# Patient Record
Sex: MALE | Race: BLACK OR AFRICAN AMERICAN | HISPANIC OR LATINO | URBAN - METROPOLITAN AREA
[De-identification: names, ages, dates, MRNs, and addresses within clinical notes are randomized per-mention and may not be internally consistent; named-entity substitution may affect disease eponyms.]

---

## 2022-06-08 ENCOUNTER — APPOINTMENT (EMERGENCY)
Dept: RADIOLOGY | Facility: HOSPITAL | Age: 37
End: 2022-06-08
Payer: COMMERCIAL

## 2022-06-08 ENCOUNTER — HOSPITAL ENCOUNTER (EMERGENCY)
Facility: HOSPITAL | Age: 37
Discharge: HOME | End: 2022-06-08
Attending: EMERGENCY MEDICINE | Admitting: EMERGENCY MEDICINE
Payer: COMMERCIAL

## 2022-06-08 VITALS
TEMPERATURE: 98 F | SYSTOLIC BLOOD PRESSURE: 132 MMHG | OXYGEN SATURATION: 94 % | BODY MASS INDEX: 32.47 KG/M2 | WEIGHT: 245 LBS | HEART RATE: 57 BPM | RESPIRATION RATE: 18 BRPM | DIASTOLIC BLOOD PRESSURE: 72 MMHG | HEIGHT: 73 IN

## 2022-06-08 DIAGNOSIS — R42 DIZZINESS: Primary | ICD-10-CM

## 2022-06-08 DIAGNOSIS — R07.89 ATYPICAL CHEST PAIN: ICD-10-CM

## 2022-06-08 LAB
2HR DELTA HS TROPONIN: 0 NG/L
ALBUMIN SERPL BCP-MCNC: 4.4 G/DL (ref 3.5–5)
ALP SERPL-CCNC: 39 U/L (ref 34–104)
ALT SERPL W P-5'-P-CCNC: 21 U/L (ref 7–52)
ANION GAP SERPL CALCULATED.3IONS-SCNC: 9 MMOL/L (ref 4–13)
AST SERPL W P-5'-P-CCNC: 12 U/L (ref 13–39)
ATRIAL RATE: 62 BPM
BASOPHILS # BLD AUTO: 0.05 THOUSANDS/ΜL (ref 0–0.1)
BASOPHILS NFR BLD AUTO: 1 % (ref 0–1)
BILIRUB SERPL-MCNC: 0.38 MG/DL (ref 0.2–1)
BUN SERPL-MCNC: 16 MG/DL (ref 5–25)
CALCIUM SERPL-MCNC: 9.8 MG/DL (ref 8.4–10.2)
CARDIAC TROPONIN I PNL SERPL HS: 3 NG/L
CARDIAC TROPONIN I PNL SERPL HS: 3 NG/L
CHLORIDE SERPL-SCNC: 103 MMOL/L (ref 96–108)
CO2 SERPL-SCNC: 25 MMOL/L (ref 21–32)
CREAT SERPL-MCNC: 1.05 MG/DL (ref 0.6–1.3)
D DIMER PPP FEU-MCNC: 0.37 UG/ML FEU
EOSINOPHIL # BLD AUTO: 0.1 THOUSAND/ΜL (ref 0–0.61)
EOSINOPHIL NFR BLD AUTO: 1 % (ref 0–6)
ERYTHROCYTE [DISTWIDTH] IN BLOOD BY AUTOMATED COUNT: 13.3 % (ref 11.6–15.1)
GFR SERPL CREATININE-BSD FRML MDRD: 88 ML/MIN/1.73SQ M
GLUCOSE SERPL-MCNC: 127 MG/DL (ref 65–140)
HCT VFR BLD AUTO: 44.3 % (ref 36.5–49.3)
HGB BLD-MCNC: 14.4 G/DL (ref 12–17)
IMM GRANULOCYTES # BLD AUTO: 0.02 THOUSAND/UL (ref 0–0.2)
IMM GRANULOCYTES NFR BLD AUTO: 0 % (ref 0–2)
LYMPHOCYTES # BLD AUTO: 1.91 THOUSANDS/ΜL (ref 0.6–4.47)
LYMPHOCYTES NFR BLD AUTO: 24 % (ref 14–44)
MCH RBC QN AUTO: 27.3 PG (ref 26.8–34.3)
MCHC RBC AUTO-ENTMCNC: 32.5 G/DL (ref 31.4–37.4)
MCV RBC AUTO: 84 FL (ref 82–98)
MONOCYTES # BLD AUTO: 0.47 THOUSAND/ΜL (ref 0.17–1.22)
MONOCYTES NFR BLD AUTO: 6 % (ref 4–12)
NEUTROPHILS # BLD AUTO: 5.3 THOUSANDS/ΜL (ref 1.85–7.62)
NEUTS SEG NFR BLD AUTO: 68 % (ref 43–75)
NRBC BLD AUTO-RTO: 0 /100 WBCS
P AXIS: 74 DEGREES
PLATELET # BLD AUTO: 304 THOUSANDS/UL (ref 149–390)
PMV BLD AUTO: 9.6 FL (ref 8.9–12.7)
POTASSIUM SERPL-SCNC: 3.8 MMOL/L (ref 3.5–5.3)
PR INTERVAL: 132 MS
PROT SERPL-MCNC: 7.6 G/DL (ref 6.4–8.4)
QRS AXIS: 64 DEGREES
QRSD INTERVAL: 90 MS
QT INTERVAL: 398 MS
QTC INTERVAL: 403 MS
RBC # BLD AUTO: 5.27 MILLION/UL (ref 3.88–5.62)
SODIUM SERPL-SCNC: 137 MMOL/L (ref 135–147)
T WAVE AXIS: 65 DEGREES
VENTRICULAR RATE: 62 BPM
WBC # BLD AUTO: 7.85 THOUSAND/UL (ref 4.31–10.16)

## 2022-06-08 PROCEDURE — 99285 EMERGENCY DEPT VISIT HI MDM: CPT | Performed by: EMERGENCY MEDICINE

## 2022-06-08 PROCEDURE — 99285 EMERGENCY DEPT VISIT HI MDM: CPT

## 2022-06-08 PROCEDURE — 93010 ELECTROCARDIOGRAM REPORT: CPT | Performed by: INTERNAL MEDICINE

## 2022-06-08 PROCEDURE — 85379 FIBRIN DEGRADATION QUANT: CPT | Performed by: EMERGENCY MEDICINE

## 2022-06-08 PROCEDURE — 84484 ASSAY OF TROPONIN QUANT: CPT | Performed by: EMERGENCY MEDICINE

## 2022-06-08 PROCEDURE — 85025 COMPLETE CBC W/AUTO DIFF WBC: CPT | Performed by: EMERGENCY MEDICINE

## 2022-06-08 PROCEDURE — 36415 COLL VENOUS BLD VENIPUNCTURE: CPT

## 2022-06-08 PROCEDURE — 93005 ELECTROCARDIOGRAM TRACING: CPT

## 2022-06-08 PROCEDURE — 71045 X-RAY EXAM CHEST 1 VIEW: CPT

## 2022-06-08 PROCEDURE — 80053 COMPREHEN METABOLIC PANEL: CPT | Performed by: EMERGENCY MEDICINE

## 2022-06-08 NOTE — ED NOTES
Pt verbalized understanding of discharge instructions; vital signs stable at discharge; ambulated out of the ED without assistance; uneventful ED visit     Jessica Valente RN  06/08/22 8580

## 2022-06-08 NOTE — ED PROVIDER NOTES
History  Chief Complaint   Patient presents with    Chest Pain    Dizziness     Patient had an episode of lightheadedness and dizziness While driving becoming diaphoretic         HPI      This is a very pleasant, nontoxic, 27-year-old male presents the emergency department as a chief complaint of an episode of lightheadedness followed by dizziness and substernal chest pain  No premature cardiovascular disease in the family  There is occurred at approximately 7 a m  Sabrina Elmore Patient is a  his shift started at 6:00 a m  Sabrina Elmore Patient reports that over the weekend he had an episode of chest pain after he was having an argument with his current significant other while be  The argument was around the fact that he is not going to be able to see his children this summer because they are located with biological mother in [facility]  Patient denies presyncope symptoms, symptoms have completely resolved  None       History reviewed  No pertinent past medical history  History reviewed  No pertinent surgical history  History reviewed  No pertinent family history  I have reviewed and agree with the history as documented  E-Cigarette/Vaping     E-Cigarette/Vaping Substances     Social History     Tobacco Use    Smoking status: Never Smoker    Smokeless tobacco: Never Used   Substance Use Topics    Drug use: Yes     Types: Marijuana       Review of Systems   Constitutional: Negative  HENT: Negative  Eyes: Negative  Respiratory: Negative  Negative for shortness of breath  Cardiovascular: Negative  Negative for chest pain  Gastrointestinal: Negative  Negative for abdominal pain  Endocrine: Negative  Genitourinary: Negative  Musculoskeletal: Negative  Skin: Negative  Allergic/Immunologic: Negative  Neurological: Negative  Hematological: Negative  Psychiatric/Behavioral: The patient is nervous/anxious  Physical Exam  Physical Exam  Vitals and nursing note reviewed  Constitutional:       Appearance: He is well-developed and normal weight  HENT:      Head: Normocephalic and atraumatic  Eyes:      Extraocular Movements: Extraocular movements intact  Pupils: Pupils are equal, round, and reactive to light  Cardiovascular:      Rate and Rhythm: Normal rate and regular rhythm  Heart sounds: Normal heart sounds  Pulmonary:      Effort: Pulmonary effort is normal       Breath sounds: Normal breath sounds  Abdominal:      General: Bowel sounds are normal       Palpations: Abdomen is soft  Musculoskeletal:         General: Normal range of motion  Cervical back: Normal range of motion and neck supple  Skin:     General: Skin is warm  Capillary Refill: Capillary refill takes less than 2 seconds  Neurological:      General: No focal deficit present  Mental Status: He is alert and oriented to person, place, and time           Vital Signs  ED Triage Vitals [06/08/22 0900]   Temperature Pulse Respirations Blood Pressure SpO2   98 °F (36 7 °C) 88 18 147/75 99 %      Temp Source Heart Rate Source Patient Position - Orthostatic VS BP Location FiO2 (%)   Temporal Monitor Sitting Left arm --      Pain Score       4           Vitals:    06/08/22 0900 06/08/22 0935 06/08/22 1054   BP: 147/75 136/81 132/72   Pulse: 88 62 57   Patient Position - Orthostatic VS: Sitting Lying Lying         Visual Acuity  Visual Acuity    Flowsheet Row Most Recent Value   L Pupil Size (mm) 4   R Pupil Size (mm) 4          ED Medications  Medications - No data to display    Diagnostic Studies  Results Reviewed     Procedure Component Value Units Date/Time    HS Troponin I 2hr [[id number]]  (Normal) Collected: 06/08/22 1058    Lab Status: Final result Specimen: Blood from Hand, Left Updated: 06/08/22 1134     hs TnI 2hr 3 ng/L      Delta 2hr hsTnI 0 ng/L     HS Troponin I 4hr [[id number]]     Lab Status: No result Specimen: Blood     D-dimer, quantitative [[id number]]  (Normal) Collected: 06/08/22 0937    Lab Status: Final result Specimen: Blood from Arm, Left Updated: 06/08/22 1040     D-Dimer, Quant 0 37 ug/ml FEU     HS Troponin 0hr (reflex protocol) [[id number]]  (Normal) Collected: 06/08/22 0854    Lab Status: Final result Specimen: Blood from Arm, Left Updated: 06/08/22 0926     hs TnI 0hr 3 ng/L     Comprehensive metabolic panel [[id number]]  (Abnormal) Collected: 06/08/22 0854    Lab Status: Final result Specimen: Blood from Arm, Left Updated: 06/08/22 0921     Sodium 137 mmol/L      Potassium 3 8 mmol/L      Chloride 103 mmol/L      CO2 25 mmol/L      ANION GAP 9 mmol/L      BUN 16 mg/dL      Creatinine 1 05 mg/dL      Glucose 127 mg/dL      Calcium 9 8 mg/dL      AST 12 U/L      ALT 21 U/L      Alkaline Phosphatase 39 U/L      Total Protein 7 6 g/dL      Albumin 4 4 g/dL      Total Bilirubin 0 38 mg/dL      eGFR 88 ml/min/1 73sq m     Narrative:      National Kidney Disease Foundation guidelines for Chronic Kidney Disease (CKD):     Stage 1 with normal or high GFR (GFR > 90 mL/min/1 73 square meters)    Stage 2 Mild CKD (GFR = 60-89 mL/min/1 73 square meters)    Stage 3A Moderate CKD (GFR = 45-59 mL/min/1 73 square meters)    Stage 3B Moderate CKD (GFR = 30-44 mL/min/1 73 square meters)    Stage 4 Severe CKD (GFR = 15-29 mL/min/1 73 square meters)    Stage 5 End Stage CKD (GFR <15 mL/min/1 73 square meters)  Note: GFR calculation is accurate only with a steady state creatinine    CBC and differential [[id number]] Collected: 06/08/22 0854    Lab Status: Final result Specimen: Blood from Arm, Left Updated: 06/08/22 0903     WBC 7 85 Thousand/uL      RBC 5 27 Million/uL      Hemoglobin 14 4 g/dL      Hematocrit 44 3 %      MCV 84 fL      MCH 27 3 pg      MCHC 32 5 g/dL      RDW 13 3 %      MPV 9 6 fL      Platelets 498 Thousands/uL      nRBC 0 /100 WBCs      Neutrophils Relative 68 %      Immat GRANS % 0 %      Lymphocytes Relative 24 %      Monocytes Relative 6 % Eosinophils Relative 1 %      Basophils Relative 1 %      Neutrophils Absolute 5 30 Thousands/µL      Immature Grans Absolute 0 02 Thousand/uL      Lymphocytes Absolute 1 91 Thousands/µL      Monocytes Absolute 0 47 Thousand/µL      Eosinophils Absolute 0 10 Thousand/µL      Basophils Absolute 0 05 Thousands/µL                  XR chest 1 view portable   ED Interpretation by Matthew Daigle III, DO (06/08 1002)   Portable chest x-ray showed no acute osseous abnormalities, no pneumothorax  Final Result by iPa Chambers MD (06/08 1038)      No acute cardiopulmonary disease  Workstation performed: [id number]                    Procedures  ECG 12 Lead Documentation Only    Date/Time: 6/8/2022 8:59 AM  Performed by: Mirtha Briseno DO  Authorized by: Matthew Daigle III, DO     Indications / Diagnosis:  CHEST PAIN  Comments:      I personally reviewed this EKG is performed the patient June 8, 2022, EKG was completed at 8:54 a m  and interpreted by me at 8:59 a m , normal sinus rhythm with a ventricular rate of 62 beats per minute, remaining portion of intervals within normal limits  No diffuse elevations to indicate pericarditis  No coved ST elevations greater than 2mm with negative T waves in V1-3 to indicate concern for brugada  No biphasic T waves in V2, V3 to indicate Wellens (critical stenosis of LAD)  No elevation in aVR or deviation when compared to V1 (can be associated with ST depression in I,II, V4-6 when left main occlusion is present)  ED Course  ED Course as of 06/08/22 1147   Wed Jun 08, 2022   9690 Patient seen evaluated, baseline labs so far are unremarkable     1128 Reviewed discharge instructions with the patient, D-dimer negative, 1st set of cardiac enzymes unremarkable, patient had more than 3 hours chest pain reproducible nature, EKG unremarkable, no presyncopal symptoms, heart score of 0, low risk Wells criteria, low risk PERC score, low risk [place] score, low risk MARTINA score, ADD-RS: low risk             HEART Risk Score    Flowsheet Row Most Recent Value   Heart Score Risk Calculator    History 0 Filed at: 06/08/2022 1117   ECG 0 Filed at: 06/08/2022 1117   Age 0 Filed at: 06/08/2022 1117   Risk Factors 0 Filed at: 06/08/2022 1117   Troponin 0 Filed at: 06/08/2022 1117   HEART Score 0 Filed at: 06/08/2022 1117                PERC Rule for PE    Flowsheet Row Most Recent Value   PERC Rule for PE    Age >=50 0 Filed at: 06/08/2022 1117   HR >=100 0 Filed at: 06/08/2022 1117   O2 Sat on room air < 95% 0 Filed at: 06/08/2022 1117   History of PE or DVT 0 Filed at: 06/08/2022 1117   Recent trauma or surgery 0 Filed at: 06/08/2022 1117   Hemoptysis 0 Filed at: 06/08/2022 1117   Exogenous estrogen 0 Filed at: 06/08/2022 1117   Unilateral leg swelling 0 Filed at: 06/08/2022 1117   PERC Rule for PE Results 0 Filed at: 06/08/2022 1117              SBIRT 20yo+    Flowsheet Row Most Recent Value   SBIRT (23 yo +)    In order to provide better care to our patients, we are screening all of our patients for alcohol and drug use  Would it be okay to ask you these screening questions? Yes Filed at: 06/08/2022 9075   Initial Alcohol Screen: US AUDIT-C     1  How often do you have a drink containing alcohol? 0 Filed at: 06/08/2022 0942   2  How many drinks containing alcohol do you have on a typical day you are drinking? 0 Filed at: 06/08/2022 0942   3a  Male UNDER 65: How often do you have five or more drinks on one occasion? 5 Filed at: 06/08/2022 0942   Audit-C Score 5 Filed at: 06/08/2022 8914   LIZANDRO: How many times in the past year have you    Used an illegal drug or used a prescription medication for non-medical reasons?  Never Filed at: 06/08/2022 0942        MARTINA Risk Score    Flowsheet Row Most Recent Value   Age >= 72 0 Filed at: 06/08/2022 1118   Known CAD (stenosis >= 50%) 0 Filed at: 06/08/2022 1118   Recent (<=24 hrs) Service Angina 0 Filed at: 06/08/2022 1118   ST Deviation >= 0 5 mm 0 Filed at: 06/08/2022 1118   3+ CAD Risk Factors (FHx, HTN, HLP, DM, Smoker) 0 Filed at: 06/08/2022 1118   Aspirin Use Past 7 Days 0 Filed at: 06/08/2022 1118   Elevated Cardiac Markers 0 Filed at: 06/08/2022 1118   MARTINA Risk Score (Calculated) 0 Filed at: 06/08/2022 1118        Portillo' Criteria for PE    Flowsheet Row Most Recent Value   Portillo' Criteria for PE    Clinical signs and symptoms of DVT 0 Filed at: 06/08/2022 1117   PE is primary diagnosis or equally likely 0 Filed at: 06/08/2022 1117   HR >100 0 Filed at: 06/08/2022 1117   Immobilization at least 3 days or Surgery in the previous 4 weeks 0 Filed at: 06/08/2022 1117   Previous, objectively diagnosed PE or DVT 0 Filed at: 06/08/2022 1117   Hemoptysis 0 Filed at: 06/08/2022 1117   Malignancy with treatment within 6 months or palliative 0 Filed at: 06/08/2022 1117   Portillo' Criteria Total 0 Filed at: 06/08/2022 1117        Naeem Guy Criteria for DVT    Flowsheet Row Most Recent Value   Patrick Criteria for DVT    Active cancer Treatment or palliation within 6 months 0 Filed at: 06/08/2022 1117   Bedridden recently >3 days or major surgery within 12 weeks 0 Filed at: 06/08/2022 1117   Calf swelling >3 cm compared to the other leg 0 Filed at: 06/08/2022 1117   Entire leg swollen 0 Filed at: 06/08/2022 1117   Collateral (nonvaricose) superficial veins present 0 Filed at: 06/08/2022 1117   Localized tenderness along the deep venous system 0 Filed at: 06/08/2022 1117   Pitting edema, confined to symptomatic leg 0 Filed at: 06/08/2022 1117   Paralysis, paresis, or recent plaster immobilization of the lower extremity 0 Filed at: 06/08/2022 1117   Previously documented DVT 0 Filed at: 06/08/2022 1117   Alternative diagnosis to DVT as likely or more likely 0 Filed at: 06/08/2022 1117   Pelham DVT Critera Score 0 Filed at: 06/08/2022 1117              MDM  Number of Diagnoses or Management Options  Atypical chest pain  Dizziness  Diagnosis management comments: Atypical chest pain in a 42year-old gentle presents the emergency department where the pain is reproducible, noted when he was driving, EKG within normal limits, all risk scores are low risk, see ED course for specific, patient is stable for discharge  Portions of the record may have been created with voice recognition software  Occasional wrong word or "sound a like" substitutions may have occurred due to the inherent limitations of voice recognition software  Read the chart carefully and recognize, using context, where substitutions have occurred  Counseling: I had a detailed discussion with the patient and/or guardian regarding: the historical points, exam findings, and any diagnostic results supporting the discharge diagnosis, lab results, radiology results, discharge instructions reviewed with patient and/or family/caregiver and understanding was verbalized  Instructions given to return to the emergency department if symptoms worsen or persist, or if there are any questions or concerns that arise at home         Amount and/or Complexity of Data Reviewed  Clinical lab tests: ordered and reviewed  Tests in the radiology section of CPT®: ordered and reviewed  Tests in the medicine section of CPT®: ordered and reviewed        Disposition  Final diagnoses:   Dizziness   Atypical chest pain     Time reflects when diagnosis was documented in both MDM as applicable and the Disposition within this note     Time User Action Codes Description Comment    6/8/2022 11:17 AM Maria Guadalupe Bradshaw Add [R42] Dizziness     6/8/2022 11:17 AM Maria Guadalupe Bradshaw Add [R07 89] Atypical chest pain       ED Disposition     ED Disposition   Discharge    Condition   Stable    Date/Time   Wed Jun 8, 2022 11:18 AM    Comment   Zulema BURNS Jewel discharge to home/self care  Follow-up Information    None         There are no discharge medications for this patient        No discharge procedures on file      PDMP Review     None          ED Provider  Electronically Signed by           Zeny Krishnan III,   06/08/22 1148

## 2022-06-08 NOTE — ED NOTES
Pt ambulated to the bathroom and back to room without assistance; pt with no needs at this time; will continue to monitor; call bell within reach and educated on use     Madeleine Dunn RN  06/08/22 1665